# Patient Record
Sex: FEMALE | Race: WHITE | NOT HISPANIC OR LATINO | Employment: FULL TIME | URBAN - METROPOLITAN AREA
[De-identification: names, ages, dates, MRNs, and addresses within clinical notes are randomized per-mention and may not be internally consistent; named-entity substitution may affect disease eponyms.]

---

## 2024-10-14 ENCOUNTER — TELEPHONE (OUTPATIENT)
Dept: PRIMARY CARE | Facility: CLINIC | Age: 64
End: 2024-10-14

## 2024-11-12 ENCOUNTER — APPOINTMENT (OUTPATIENT)
Dept: PRIMARY CARE | Facility: CLINIC | Age: 64
End: 2024-11-12

## 2024-11-12 RX ORDER — OXYCODONE AND ACETAMINOPHEN 5; 325 MG/1; MG/1
1 TABLET ORAL
COMMUNITY
Start: 2024-10-23 | End: 2024-11-14 | Stop reason: ALTCHOICE

## 2024-11-14 ENCOUNTER — APPOINTMENT (OUTPATIENT)
Dept: PRIMARY CARE | Facility: CLINIC | Age: 64
End: 2024-11-14

## 2024-11-14 VITALS
SYSTOLIC BLOOD PRESSURE: 130 MMHG | WEIGHT: 121 LBS | TEMPERATURE: 98.1 F | BODY MASS INDEX: 20.16 KG/M2 | HEIGHT: 65 IN | DIASTOLIC BLOOD PRESSURE: 82 MMHG

## 2024-11-14 DIAGNOSIS — Z78.0 ASYMPTOMATIC MENOPAUSAL STATE: ICD-10-CM

## 2024-11-14 DIAGNOSIS — Z12.31 ENCOUNTER FOR SCREENING MAMMOGRAM FOR BREAST CANCER: ICD-10-CM

## 2024-11-14 DIAGNOSIS — Z00.00 ROUTINE GENERAL MEDICAL EXAMINATION AT HEALTH CARE FACILITY: ICD-10-CM

## 2024-11-14 DIAGNOSIS — Z12.11 ENCOUNTER FOR SCREENING FOR MALIGNANT NEOPLASM OF COLON: ICD-10-CM

## 2024-11-14 DIAGNOSIS — F41.9 ANXIETY: Primary | ICD-10-CM

## 2024-11-14 DIAGNOSIS — E46 PROTEIN-CALORIE MALNUTRITION, UNSPECIFIED SEVERITY (MULTI): ICD-10-CM

## 2024-11-14 PROBLEM — F43.20 ADJUSTMENT DISORDER: Status: ACTIVE | Noted: 2022-08-17

## 2024-11-14 PROCEDURE — 1170F FXNL STATUS ASSESSED: CPT | Performed by: PHYSICIAN ASSISTANT

## 2024-11-14 PROCEDURE — G0439 PPPS, SUBSEQ VISIT: HCPCS | Performed by: PHYSICIAN ASSISTANT

## 2024-11-14 PROCEDURE — 3008F BODY MASS INDEX DOCD: CPT | Performed by: PHYSICIAN ASSISTANT

## 2024-11-14 PROCEDURE — 1036F TOBACCO NON-USER: CPT | Performed by: PHYSICIAN ASSISTANT

## 2024-11-14 PROCEDURE — 1159F MED LIST DOCD IN RCRD: CPT | Performed by: PHYSICIAN ASSISTANT

## 2024-11-14 PROCEDURE — 1123F ACP DISCUSS/DSCN MKR DOCD: CPT | Performed by: PHYSICIAN ASSISTANT

## 2024-11-14 PROCEDURE — 1125F AMNT PAIN NOTED PAIN PRSNT: CPT | Performed by: PHYSICIAN ASSISTANT

## 2024-11-14 PROCEDURE — 1158F ADVNC CARE PLAN TLK DOCD: CPT | Performed by: PHYSICIAN ASSISTANT

## 2024-11-14 PROCEDURE — 99204 OFFICE O/P NEW MOD 45 MIN: CPT | Performed by: PHYSICIAN ASSISTANT

## 2024-11-14 PROCEDURE — 1160F RVW MEDS BY RX/DR IN RCRD: CPT | Performed by: PHYSICIAN ASSISTANT

## 2024-11-14 RX ORDER — BUSPIRONE HYDROCHLORIDE 5 MG/1
5 TABLET ORAL 3 TIMES DAILY
Qty: 90 TABLET | Refills: 3 | Status: SHIPPED | OUTPATIENT
Start: 2024-11-14 | End: 2025-03-14

## 2024-11-14 ASSESSMENT — COLUMBIA-SUICIDE SEVERITY RATING SCALE - C-SSRS
6. HAVE YOU EVER DONE ANYTHING, STARTED TO DO ANYTHING, OR PREPARED TO DO ANYTHING TO END YOUR LIFE?: NO
2. HAVE YOU ACTUALLY HAD ANY THOUGHTS OF KILLING YOURSELF?: NO
1. IN THE PAST MONTH, HAVE YOU WISHED YOU WERE DEAD OR WISHED YOU COULD GO TO SLEEP AND NOT WAKE UP?: NO

## 2024-11-14 ASSESSMENT — ENCOUNTER SYMPTOMS
CHOKING: 0
CHILLS: 0
CONFUSION: 0
HEADACHES: 0
PALPITATIONS: 0
POLYPHAGIA: 0
EYE PAIN: 0
FREQUENCY: 0
DIARRHEA: 0
LOSS OF SENSATION IN FEET: 0
ABDOMINAL PAIN: 0
APPETITE CHANGE: 0
HEMATURIA: 0
NERVOUS/ANXIOUS: 0
NUMBNESS: 0
DEPRESSION: 0
ARTHRALGIAS: 0
FACIAL SWELLING: 0
WEAKNESS: 0
CHEST TIGHTNESS: 0
DIFFICULTY URINATING: 0
EYE DISCHARGE: 0
ANAL BLEEDING: 0
VOMITING: 0
SLEEP DISTURBANCE: 0
JOINT SWELLING: 0
COLOR CHANGE: 0
SORE THROAT: 0
COUGH: 0
TREMORS: 0
NAUSEA: 0
WHEEZING: 0
OCCASIONAL FEELINGS OF UNSTEADINESS: 0
SHORTNESS OF BREATH: 0
MYALGIAS: 0
FATIGUE: 0
DIZZINESS: 0
FEVER: 0
CONSTIPATION: 0
ABDOMINAL DISTENTION: 0
POLYDIPSIA: 0

## 2024-11-14 ASSESSMENT — GERIATRIC MINI NUTRITIONAL ASSESSMENT (MNA)
A HAS FOOD INTAKE DECLINED OVER THE PAST 3 MONTHS DUE TO LOSS OF APPETITE, DIGESTIVE PROBLEMS, CHEWING OR SWALLOWING DIFFICULTIES?: NO DECREASE IN FOOD INTAKE
D HAS SUFFERED PSYCHOLOGICAL STRESS OR ACUTE DISEASE IN THE PAST 3 MONTHS?: NO
E NEUROPSYCHOLOGICAL PROBLEMS: NO PSYCHOLOGICAL PROBLEMS
B WEIGHT LOSS DURING THE LAST 3 MONTHS: NO WEIGHT LOSS
C GENERAL MOBILITY: GOES OUT

## 2024-11-14 ASSESSMENT — ACTIVITIES OF DAILY LIVING (ADL)
WALKS IN HOME: INDEPENDENT
DOING HOUSEWORK: INDEPENDENT
GROOMING: INDEPENDENT
GROCERY_SHOPPING: INDEPENDENT
TOILETING: INDEPENDENT
MANAGING_FINANCES: INDEPENDENT
DRESSING YOURSELF: INDEPENDENT
DOING_HOUSEWORK: INDEPENDENT
ADEQUATE_TO_COMPLETE_ADL: YES
JUDGMENT_ADEQUATE_SAFELY_COMPLETE_DAILY_ACTIVITIES: YES
FEEDING YOURSELF: INDEPENDENT
GROCERY SHOPPING: INDEPENDENT
MANAGING FINANCES: INDEPENDENT
NEEDS ASSISTANCE WITH FOOD: INDEPENDENT
HEARING - LEFT EAR: FUNCTIONAL
EATING: INDEPENDENT
PREPARING MEALS: INDEPENDENT
BATHING: INDEPENDENT
ASSISTIVE_DEVICE: EYEGLASSES;DENTURES UPPER;DENTURES LOWER
USING TRANSPORTATION: INDEPENDENT
STIL DRIVING: YES
HEARING - RIGHT EAR: FUNCTIONAL
PILL BOX USED: NO
USING TELEPHONE: INDEPENDENT
PATIENT'S MEMORY ADEQUATE TO SAFELY COMPLETE DAILY ACTIVITIES?: YES
TAKING MEDICATION: INDEPENDENT
TAKING_MEDICATION: INDEPENDENT

## 2024-11-14 ASSESSMENT — PAIN SCALES - GENERAL: PAINLEVEL_OUTOF10: 6

## 2024-11-14 ASSESSMENT — PATIENT HEALTH QUESTIONNAIRE - PHQ9
1. LITTLE INTEREST OR PLEASURE IN DOING THINGS: NOT AT ALL
SUM OF ALL RESPONSES TO PHQ9 QUESTIONS 1 AND 2: 0
2. FEELING DOWN, DEPRESSED OR HOPELESS: NOT AT ALL

## 2024-11-14 NOTE — PROGRESS NOTES
Subjective   Patient ID: Tere Harris is a 64 y.o. female with a history of anxiety, bilateral frozen shoulders who presents for New Patient Visit.    HPI patient is presented to become established as a new patient with a primary care provider. She is under a lot of stress and has been felling anxious. She had a few deaths in couple of months. She sleeps well. She denies depression.   History of bilateral frozen shoulders for which she has been seen by orthopedic surgery.  She does exercises at home and takes ibuprofen as needed.  Stated she is a  and works full-time which exacerbates her pains.  She quit alcohol on 12/15/2022.  Denies using illicit drugs.  She does not smoke.  She denies shortness of breath, chest pain, leg edema, nausea, vomiting, constipation, or diarrhea.  She watches what she eats.    Review of Systems   Constitutional:  Negative for appetite change, chills, fatigue and fever.   HENT:  Negative for congestion, ear pain, facial swelling, hearing loss, nosebleeds and sore throat.    Eyes:  Negative for pain, discharge and visual disturbance.   Respiratory:  Negative for cough, choking, chest tightness, shortness of breath and wheezing.    Cardiovascular:  Negative for chest pain, palpitations and leg swelling.   Gastrointestinal:  Negative for abdominal distention, abdominal pain, anal bleeding, constipation, diarrhea, nausea and vomiting.   Endocrine: Negative for cold intolerance, heat intolerance, polydipsia, polyphagia and polyuria.   Genitourinary:  Negative for difficulty urinating, frequency, hematuria and urgency.   Musculoskeletal:  Negative for arthralgias, gait problem, joint swelling and myalgias.   Skin:  Negative for color change and rash.   Neurological:  Negative for dizziness, tremors, syncope, weakness, numbness and headaches.   Psychiatric/Behavioral:  Negative for behavioral problems, confusion, sleep disturbance and suicidal ideas. The patient is not nervous/anxious.   "      Objective   /82 (Patient Position: Sitting)   Temp 36.7 °C (98.1 °F) (Temporal)   Ht 1.651 m (5' 5\")   Wt 54.9 kg (121 lb)   Breastfeeding No   BMI 20.14 kg/m²     Physical Exam  Constitutional:       General: She is not in acute distress.     Appearance: Normal appearance.   HENT:      Head: Normocephalic and atraumatic.      Nose: Nose normal.   Eyes:      Extraocular Movements: Extraocular movements intact.      Conjunctiva/sclera: Conjunctivae normal.      Pupils: Pupils are equal, round, and reactive to light.   Cardiovascular:      Rate and Rhythm: Normal rate and regular rhythm.      Pulses: Normal pulses.      Heart sounds: Normal heart sounds.   Pulmonary:      Effort: Pulmonary effort is normal.      Breath sounds: Normal breath sounds.   Abdominal:      General: Bowel sounds are normal.      Palpations: Abdomen is soft.   Musculoskeletal:         General: Normal range of motion.      Cervical back: Normal range of motion and neck supple.   Neurological:      General: No focal deficit present.      Mental Status: She is alert and oriented to person, place, and time.   Psychiatric:         Mood and Affect: Mood normal.         Behavior: Behavior normal.         Thought Content: Thought content normal.         Judgment: Judgment normal.         Assessment/Plan     Anxiety  Start buspirone 5 mg 3 times a day  May increase to 2 or 3 tablets if needed  Try to cope with anxiety by breathing exercises, yoga, meditation, working out    Bilateral frozen shoulders  Stable  Follow-up with orthopedic surgery Dr. Parham    Protein calorie malnutrition  Stable  Body mass index is 20.14 kg/m².    Health maintenance  DEXA bone density and mammogram ordered  Cologuard ordered  Vaccinations declined    Follow-up in 3 weeks     "

## 2024-11-14 NOTE — PROGRESS NOTES
"Subjective   Reason for Visit: Tere Harris is an 64 y.o. female here for a Medicare Wellness visit.     Past Medical, Surgical, and Family History reviewed and updated in chart.    Reviewed all medications by prescribing practitioner or clinical pharmacist (such as prescriptions, OTCs, herbal therapies and supplements) and documented in the medical record.    HPI    Patient Care Team:  Rhoda Vegas PA-C as PCP - General (Internal Medicine)     Review of Systems    Objective   Vitals:  /82 (Patient Position: Sitting)   Temp 36.7 °C (98.1 °F) (Temporal)   Ht 1.651 m (5' 5\")   Wt 54.9 kg (121 lb)   Breastfeeding No   BMI 20.14 kg/m²       Physical Exam    Assessment & Plan  Protein-calorie malnutrition, unspecified severity (Multi)         Routine general medical examination at health care facility    Orders:    1 Year Follow Up In Primary Care - Wellness Exam; Future    Encounter for screening mammogram for breast cancer    Orders:    BI mammo bilateral screening tomosynthesis; Future    Asymptomatic menopausal state    Orders:    XR DEXA bone density; Future    Encounter for screening for malignant neoplasm of colon    Orders:    Cologuard® colon cancer screening; Future    Cologuard® colon cancer screening    Anxiety    Orders:    busPIRone (Buspar) 5 mg tablet; Take 1 tablet (5 mg) by mouth 3 times a day.              "

## 2024-12-09 ENCOUNTER — HOSPITAL ENCOUNTER (OUTPATIENT)
Dept: RADIOLOGY | Facility: CLINIC | Age: 64
Discharge: HOME | End: 2024-12-09
Payer: MEDICARE

## 2024-12-09 ENCOUNTER — APPOINTMENT (OUTPATIENT)
Dept: PRIMARY CARE | Facility: CLINIC | Age: 64
End: 2024-12-09
Payer: MEDICARE

## 2024-12-09 VITALS — WEIGHT: 121.03 LBS | BODY MASS INDEX: 20.17 KG/M2 | HEIGHT: 65 IN

## 2024-12-09 DIAGNOSIS — Z12.31 ENCOUNTER FOR SCREENING MAMMOGRAM FOR BREAST CANCER: ICD-10-CM

## 2024-12-09 PROCEDURE — 77063 BREAST TOMOSYNTHESIS BI: CPT

## 2024-12-17 ENCOUNTER — APPOINTMENT (OUTPATIENT)
Dept: PRIMARY CARE | Facility: CLINIC | Age: 64
End: 2024-12-17
Payer: MEDICARE

## 2024-12-23 ENCOUNTER — TELEPHONE (OUTPATIENT)
Dept: PRIMARY CARE | Facility: CLINIC | Age: 64
End: 2024-12-23
Payer: MEDICARE

## 2024-12-23 ENCOUNTER — HOSPITAL ENCOUNTER (OUTPATIENT)
Dept: RADIOLOGY | Facility: EXTERNAL LOCATION | Age: 64
Discharge: HOME | End: 2024-12-23
Payer: MEDICARE

## 2024-12-23 NOTE — TELEPHONE ENCOUNTER
----- Message from Rhoda Vegas sent at 12/23/2024  9:08 AM EST -----  Please call her.  Negative mammogram.  Repeat in 1 year.

## 2024-12-30 ENCOUNTER — OFFICE VISIT (OUTPATIENT)
Dept: URGENT CARE | Age: 64
End: 2024-12-30
Payer: MEDICARE

## 2024-12-30 VITALS
HEIGHT: 67 IN | TEMPERATURE: 98 F | HEART RATE: 75 BPM | OXYGEN SATURATION: 95 % | DIASTOLIC BLOOD PRESSURE: 71 MMHG | WEIGHT: 120 LBS | SYSTOLIC BLOOD PRESSURE: 133 MMHG | BODY MASS INDEX: 18.83 KG/M2 | RESPIRATION RATE: 18 BRPM

## 2024-12-30 DIAGNOSIS — R05.9 COUGH, UNSPECIFIED TYPE: ICD-10-CM

## 2024-12-30 DIAGNOSIS — J10.1 INFLUENZA A: Primary | ICD-10-CM

## 2024-12-30 LAB
POC RAPID INFLUENZA A: POSITIVE
POC RAPID INFLUENZA B: NEGATIVE
POC SARS-COV-2 AG BINAX: NORMAL

## 2024-12-30 PROCEDURE — 1160F RVW MEDS BY RX/DR IN RCRD: CPT | Performed by: PHYSICIAN ASSISTANT

## 2024-12-30 PROCEDURE — 3008F BODY MASS INDEX DOCD: CPT | Performed by: PHYSICIAN ASSISTANT

## 2024-12-30 PROCEDURE — 87811 SARS-COV-2 COVID19 W/OPTIC: CPT | Performed by: PHYSICIAN ASSISTANT

## 2024-12-30 PROCEDURE — 1036F TOBACCO NON-USER: CPT | Performed by: PHYSICIAN ASSISTANT

## 2024-12-30 PROCEDURE — 87804 INFLUENZA ASSAY W/OPTIC: CPT | Performed by: PHYSICIAN ASSISTANT

## 2024-12-30 PROCEDURE — 99203 OFFICE O/P NEW LOW 30 MIN: CPT | Performed by: PHYSICIAN ASSISTANT

## 2024-12-30 PROCEDURE — 1159F MED LIST DOCD IN RCRD: CPT | Performed by: PHYSICIAN ASSISTANT

## 2024-12-30 RX ORDER — BENZONATATE 200 MG/1
200 CAPSULE ORAL 3 TIMES DAILY PRN
Qty: 30 CAPSULE | Refills: 0 | Status: SHIPPED | OUTPATIENT
Start: 2024-12-30

## 2024-12-30 ASSESSMENT — ENCOUNTER SYMPTOMS
SHORTNESS OF BREATH: 0
DIAPHORESIS: 1
SORE THROAT: 1
SINUS PRESSURE: 1
FEVER: 0
COUGH: 1
RHINORRHEA: 1
CHILLS: 1

## 2024-12-30 NOTE — PATIENT INSTRUCTIONS
You have tested positive for influenza. Quarantine until feeling better AND fever free for 24 hours.    After quarantine wear mask for 5 days.    Go to emergency department for worsening symptoms or concerns.    Alternate ibuprofen, 800mg max (with food), and tylenol 1000mg max, every 4 hours for discomfort/fever.     Use saline nasal spray     take antihistamine during the day, ex zyrtec, claritin, etc.     take decongestant at night, ex robitussin, delsym, etc.     gargle with 1tsp salt in 8 oz of warm water/listerine original.     use cepacol throat lozenges/chloroseptic spray.     take benzonatate for cough.     follow up with primary care if no improvement in 3 - 4 days.

## 2024-12-30 NOTE — PROGRESS NOTES
"Subjective   Patient ID: Tere Harris is a 64 y.o. female. They present today with a chief complaint of Cough (Cough runny nose body ache 4 days ).    History of Present Illness  Productive cough, chills, sweats, nasal congestion/rhinorrhea, sore throat started on Friday.     Denies fever, chest pain, SOB, ear pain.       Cough  Associated symptoms include chills, rhinorrhea and a sore throat. Pertinent negatives include no chest pain, ear pain, fever or shortness of breath.       Past Medical History  Allergies as of 12/30/2024    (No Known Allergies)       (Not in a hospital admission)       History reviewed. No pertinent past medical history.    History reviewed. No pertinent surgical history.     reports that she has never smoked. She has never used smokeless tobacco. She reports that she does not drink alcohol and does not use drugs.    Review of Systems  Review of Systems   Constitutional:  Positive for chills and diaphoresis. Negative for fever.   HENT:  Positive for congestion, rhinorrhea, sinus pressure and sore throat. Negative for ear pain.    Respiratory:  Positive for cough. Negative for shortness of breath.    Cardiovascular:  Negative for chest pain.   All other systems reviewed and are negative.                                 Objective    Vitals:    12/30/24 0945   BP: 133/71   BP Location: Left arm   Patient Position: Sitting   BP Cuff Size: Small adult   Pulse: 75   Resp: 18   Temp: 36.7 °C (98 °F)   TempSrc: Oral   SpO2: 95%   Weight: 54.4 kg (120 lb)   Height: 1.702 m (5' 7\")     No LMP recorded. Patient is postmenopausal.    Physical Exam  Vitals and nursing note reviewed.   Constitutional:       General: She is not in acute distress.  HENT:      Right Ear: Tympanic membrane normal.      Left Ear: Tympanic membrane normal.      Nose: Congestion and rhinorrhea present.      Mouth/Throat:      Pharynx: No oropharyngeal exudate or posterior oropharyngeal erythema.   Cardiovascular:      Rate and " Rhythm: Normal rate and regular rhythm.      Heart sounds: Normal heart sounds.   Pulmonary:      Effort: Pulmonary effort is normal.      Breath sounds: Normal breath sounds.   Neurological:      Mental Status: She is alert.         Procedures    Point of Care Test & Imaging Results from this visit  Results for orders placed or performed in visit on 12/30/24   POCT Influenza A/B manually resulted   Result Value Ref Range    POC Rapid Influenza A Positive (A) Negative    POC Rapid Influenza B Negative Negative   POCT Covid-19 Rapid Antigen   Result Value Ref Range    POC BRIAN-COV-2 AG  Presumptive negative test for SARS-CoV-2 (no antigen detected)     Presumptive negative test for SARS-CoV-2 (no antigen detected)      No results found.    Diagnostic study results (if any) were reviewed by Danica Olvera PA-C.    Assessment/Plan   Allergies, medications, history, and pertinent labs/EKGs/Imaging reviewed by Danica Olvera PA-C.     Orders and Diagnoses  Diagnoses and all orders for this visit:  Cough, unspecified type  -     POCT Influenza A/B manually resulted  -     POCT Covid-19 Rapid Antigen      Medical Admin Record      Patient disposition: Home    Electronically signed by Danica Olvera PA-C  10:04 AM

## 2025-01-03 ENCOUNTER — APPOINTMENT (OUTPATIENT)
Dept: RADIOLOGY | Facility: CLINIC | Age: 65
End: 2025-01-03
Payer: MEDICARE

## 2025-08-05 ENCOUNTER — APPOINTMENT (OUTPATIENT)
Dept: PRIMARY CARE | Facility: CLINIC | Age: 65
End: 2025-08-05
Payer: MEDICARE

## 2025-10-01 ENCOUNTER — APPOINTMENT (OUTPATIENT)
Dept: PRIMARY CARE | Facility: CLINIC | Age: 65
End: 2025-10-01
Payer: MEDICARE

## 2025-10-20 ENCOUNTER — APPOINTMENT (OUTPATIENT)
Dept: PRIMARY CARE | Facility: CLINIC | Age: 65
End: 2025-10-20
Payer: MEDICARE